# Patient Record
Sex: FEMALE | Race: WHITE
[De-identification: names, ages, dates, MRNs, and addresses within clinical notes are randomized per-mention and may not be internally consistent; named-entity substitution may affect disease eponyms.]

---

## 2016-03-05 VITALS — DIASTOLIC BLOOD PRESSURE: 98 MMHG | SYSTOLIC BLOOD PRESSURE: 176 MMHG

## 2020-01-14 ENCOUNTER — HOSPITAL ENCOUNTER (OUTPATIENT)
Dept: HOSPITAL 61 - PF | Age: 55
Discharge: HOME | End: 2020-01-14
Attending: NURSE PRACTITIONER
Payer: SELF-PAY

## 2020-01-14 DIAGNOSIS — J44.1: Primary | ICD-10-CM

## 2020-01-14 PROCEDURE — 94640 AIRWAY INHALATION TREATMENT: CPT

## 2020-01-14 PROCEDURE — 94060 EVALUATION OF WHEEZING: CPT

## 2020-01-21 NOTE — RESP
DATE OF SERVICE:  



Referred by Grady Chan, nurse practitioner.  The patient's FVC was

3.07, which is 80% predicted; FEV1 of 2.30, which is 76% predicted.  The

FEV1/FVC ratio was normal.  FEF 25-75 was 68% predicted.  There was no response

to bronchodilators.



IMPRESSION:

1.  Spirometry findings suggesting small airway dysfunction/mild obstructive

airway disease.

2.  No response to bronchodilators.

 



______________________________

LIBORIO CAMILO MD



DR:  [email or web address]/[email or web address]  JOB#:  [id number] / [id number]

DD:  01/21/2020 12:11  DT:  01/21/2020 12:48